# Patient Record
Sex: MALE | Race: WHITE | NOT HISPANIC OR LATINO | URBAN - METROPOLITAN AREA
[De-identification: names, ages, dates, MRNs, and addresses within clinical notes are randomized per-mention and may not be internally consistent; named-entity substitution may affect disease eponyms.]

---

## 2019-09-03 ENCOUNTER — OFFICE VISIT - RIVER FALLS (OUTPATIENT)
Dept: FAMILY MEDICINE | Facility: CLINIC | Age: 64
End: 2019-09-03

## 2019-09-03 ASSESSMENT — MIFFLIN-ST. JEOR: SCORE: 2158.61

## 2019-10-07 ENCOUNTER — OFFICE VISIT - RIVER FALLS (OUTPATIENT)
Dept: FAMILY MEDICINE | Facility: CLINIC | Age: 64
End: 2019-10-07

## 2019-10-07 ASSESSMENT — MIFFLIN-ST. JEOR: SCORE: 2137.74

## 2022-02-12 VITALS
HEART RATE: 72 BPM | HEIGHT: 71 IN | BODY MASS INDEX: 42.11 KG/M2 | HEIGHT: 71 IN | DIASTOLIC BLOOD PRESSURE: 72 MMHG | DIASTOLIC BLOOD PRESSURE: 74 MMHG | WEIGHT: 296.2 LBS | HEART RATE: 72 BPM | WEIGHT: 300.8 LBS | BODY MASS INDEX: 41.47 KG/M2 | SYSTOLIC BLOOD PRESSURE: 120 MMHG | SYSTOLIC BLOOD PRESSURE: 122 MMHG

## 2022-02-15 NOTE — TELEPHONE ENCOUNTER
---------------------  From: Anna Alves CMA (Phone Messages Pool ([id number]_[facility]))   To: Sleep Message Pool ([id number]_[facility]); ZIM Message Pool (32224_[place] [facility] [place]);     Sent: 9/10/2019 11:00:45 AM CDT  Subject: cpap mask     Phone Message    PCP:   FERMIN      Time of Call:  1057       Person Calling:  pt  Phone number:  [phone]    Returned call at: _    Note:   Pt saw FERMIN on 9/3 for new cpap order. He uses a nasal mask and that was not on order. [facility] should be sending us something for this order (it looks like order scanned into chart from 9/4 but order not signed by FERMIN). Please resend with nasal mask.    Last office visit and reason:  _Pt called and informed that this was faxed.

## 2022-02-15 NOTE — PROGRESS NOTES
Chief Complaint    f/u cpap machine-  having problems with new machine  History of Present Illness      64-year-old here to discuss his sleep apnea      He has a new machine set at 13 cm says though it seems to be struggling still is very tired now during the day and it seems comfortable to wear through the night and is been taking it off during sleep download does show an AHI of 50 at this pressure.  Only a little bit of leaking.  Review of Systems      Positive for daytime sleepiness negative for cough negative for shortness of breath negative for congestion  Physical Exam   Vitals & Measurements    HR: 72(Peripheral)  BP: 120/72     HT: 70.5 in  WT: 296.2 lb  BMI: 41.89       Alert and oriented BMI of 42       Nonlabored respirations       Normal oropharynx except for malipotti III  Assessment/Plan       1. Obstructive sleep apnea syndrome (G47.33)         Changes machine to auto titrating 8-20 talked about what he should expect to let us know if he is having problems and will recheck his download in 1 week  Patient Information     Name:EMILIA GRECO      Address:      [address] [place] [id number]     Sex:Male     YOB: 1955     [phone]     Emergency Contact:[facility] EMERGENCY, CONTACT     MRN:[id number]     FIN:[id number]     Location:[facility]     Date of Service:10/07/2019      Primary Care Physician:       Jeff Wilder MD, [phone]      Attending Physician:       Jeff Wilder MD, [phone]  Problem List/Past Medical History    Ongoing     Bipolar disorder, unspecified     Degeneration of lumbar or lumbosacral intervertebral disc     Depressive disorder, not elsewhere classified     Esophageal reflux     Hypertension     Nonspecific abnormal results of function study of kidney     Obesity, unspecified     Obstructive sleep apnea syndrome     Other and unspecified hyperlipidemia     Unspecified sleep apnea    Historical     left  hand numbness     left medial elbow pain  Medications    amLODIPine, Oral, daily    CLONAZEPAM 0.5 MG ORAL TABLET ([id number]), 0.5 mg, Oral, tid    DOXAZOSIN 2 MG ORAL TABLET ([id number]), 2 mg, Oral    METOPROLOL TARTRATE 50 MG ORAL TABLET ([id number]), 50 mg, Oral, bid    Nexium, Oral, daily    PRAVASTATIN 20 MG ORAL TABLET ([id number]), 20 mg, Oral    Replacement CPAP +13cm H2o for use daily, See Instructions, 11 refills    ZOLOFT 100 MG ORAL TABLET ([id number]), 100 mg, Oral, daily  Allergies    Bee Stings    No known allergies  Social History    Smoking Status - 10/07/2019     Never smoker  Immunizations      Vaccine Date Status      influenza virus vaccine, inactivated 09/26/2019 Recorded      influenza virus vaccine, inactivated 08/13/2018 Recorded      zoster vaccine, inactivated 08/13/2018 Recorded      zoster vaccine, inactivated 05/04/2018 Recorded      influenza virus vaccine, inactivated 09/14/2017 Recorded      pneumococcal (PCV13) 08/17/2017 Recorded      influenza virus vaccine, inactivated 09/21/2016 Recorded      pneumococcal (PPSV23) 04/12/2016 Recorded      influenza virus vaccine, inactivated 09/22/2015 Recorded      tetanus/diphth/pertuss (Tdap) adult/adol 09/12/2015 Recorded      influenza virus vaccine, inactivated 09/29/2014 Recorded      influenza virus vaccine, inactivated 10/09/2013 Recorded      influenza virus vaccine, inactivated 09/18/2012 Recorded      tetanus/diphth/pertuss (Tdap) adult/adol 06/18/2012 Recorded      ZOS, shingles 09/27/2011 Recorded      influenza virus vaccine, inactivated 09/26/2011 Recorded      influenza virus vaccine, inactivated 10/12/2010 Recorded      influenza virus vaccine, inactivated 08/28/2009 Recorded      influenza virus vaccine, inactivated 10/29/2008 Recorded      influenza virus vaccine, inactivated 10/22/2007 Recorded      Td 07/23/2003 Recorded

## 2022-02-15 NOTE — TELEPHONE ENCOUNTER
---------------------  From: Jeff Wilder MD   Sent: 11/8/2019 5:28:35 PM CST  Subject: General Message     download of CPAP done.   AHI is 12 but much better than previous wiht mild leak

## 2022-02-15 NOTE — PROGRESS NOTES
Chief Complaint    Is needing a new cpap machine because his stopped working.  History of Present Illness      He has a history of severe obstructive sleep apnea and AHI is 122.  He has been on CPAP with wonderful response.  Recently his machine stopped working he talk to his supplier says he needs a new prescription and they will supply him with a machine  Review of Systems      No fever, no nasal congestion, no headaches, no shortness of breath  Physical Exam   Vitals & Measurements    HR: 72(Peripheral)  BP: 122/74     HT: 70.5 in  WT: 300.8 lb  BMI: 42.55       Alert and oriented      Neck is thick tongue is prominent and obscures the uvula       Lungs are clear       2+ peripheral edema lower extremities  Assessment/Plan       1. Obstructive sleep apnea syndrome (G47.33)         Patient has had a good response to CPAP at 13 cm of pressure will help him get a new machine so he can continue his therapy       Orders:         Miscellaneous Rx Supply, Replacement CPAP +13cm H2o for use daily, See Instructions, Instructions: Heated humidifier x1; Humidifier chamber x1; Heated tubing x1; Full face mask of choice with headgear x1; Cushion x 1; Filters: Disposable x1pk & Reusable x1pk. Length of Ne..., (Ordered)  Patient Information     Name:EMILIA GRECO      Address:      [address] [place] [id number]     Sex:Male     YOB: 1955     MRN:[id number]     FIN:[id number]     Location:[facility]     Date of Service:09/03/2019      Primary Care Physician:       Jeff Wilder MD, [phone]      Attending Physician:       Jeff Wilder MD, [phone]  Problem List/Past Medical History    Ongoing     Bipolar disorder, unspecified     Degeneration of lumbar or lumbosacral intervertebral disc     Depressive disorder, not elsewhere classified     Esophageal reflux     Hypertension     Nonspecific abnormal results of function study of kidney     Obesity, unspecified      Obstructive sleep apnea syndrome     Other and unspecified hyperlipidemia     Unspecified sleep apnea    Historical     left hand numbness     left medial elbow pain  Medications    amLODIPine, Oral, daily    CLONAZEPAM 0.5 MG ORAL TABLET ([id number]), 0.5 mg, Oral, tid    DOXAZOSIN 2 MG ORAL TABLET ([id number]), 2 mg, Oral    METOPROLOL TARTRATE 50 MG ORAL TABLET ([id number]), 50 mg, Oral, bid    Nexium, Oral, daily    PRAVASTATIN 20 MG ORAL TABLET ([id number]), 20 mg, Oral    Replacement CPAP +13cm H2o for use daily, See Instructions, 11 refills    ZOLOFT 100 MG ORAL TABLET ([id number]), 100 mg, Oral, daily  Allergies    Bee Stings    No known allergies  Social History    Smoking Status - 09/03/2019     Never smoker  Immunizations      Vaccine Date Status      influenza virus vaccine, inactivated 08/13/2018 Recorded      zoster vaccine, inactivated 08/13/2018 Recorded      zoster vaccine, inactivated 05/04/2018 Recorded      influenza virus vaccine, inactivated 09/14/2017 Recorded      pneumococcal (PCV13) 08/17/2017 Recorded      influenza virus vaccine, inactivated 09/21/2016 Recorded      pneumococcal (PPSV23) 04/12/2016 Recorded      influenza virus vaccine, inactivated 09/22/2015 Recorded      tetanus/diphth/pertuss (Tdap) adult/adol 09/12/2015 Recorded      influenza virus vaccine, inactivated 09/29/2014 Recorded      influenza virus vaccine, inactivated 10/09/2013 Recorded      influenza virus vaccine, inactivated 09/18/2012 Recorded      tetanus/diphth/pertuss (Tdap) adult/adol 06/18/2012 Recorded      ZOS, shingles 09/27/2011 Recorded      influenza virus vaccine, inactivated 09/26/2011 Recorded      influenza virus vaccine, inactivated 10/12/2010 Recorded      influenza virus vaccine, inactivated 08/28/2009 Recorded      influenza virus vaccine, inactivated 10/29/2008 Recorded      influenza virus vaccine, inactivated 10/22/2007 Recorded      Td 07/23/2003 Recorded

## 2022-02-15 NOTE — NURSING NOTE
Comprehensive Intake Entered On:  9/3/2019 3:15 PM CDT    Performed On:  9/3/2019 3:11 PM CDT by Ana VILLAFUERTE Mayte               Summary   Chief Complaint :   Is needing a new cpap machine because his stopped working.   Weight Measured :   300.8 lb(Converted to: 300 lb 13 oz, 136.44 kg)    Height Measured :   70.5 in(Converted to: 5 ft 10 in, 179.07 cm)    Body Mass Index :   42.55 kg/m2 (HI)    Body Surface Area :   2.6 m2   Systolic Blood Pressure :   122 mmHg   Diastolic Blood Pressure :   74 mmHg   Mean Arterial Pressure :   90 mmHg   Peripheral Pulse Rate :   72 bpm   Ana VILLAFUERTE Mayte - 9/3/2019 3:11 PM CDT   Health Status   Allergies Verified? :   Yes   Medication History Verified? :   Yes   Pre-Visit Planning Status :   Completed   Tobacco Use? :   Never smoker   Mayte Perez CMA - 9/3/2019 3:11 PM CDT   Consents   Consent for Immunization Exchange :   Consent Granted   Consent for Immunizations to Providers :   Consent Granted   Mayte Perez CMA - 9/3/2019 3:11 PM CDT   Meds / Allergies   (As Of: 9/3/2019 3:15:59 PM CDT)   Allergies (Active)   Bee Stings  Estimated Onset Date:   Unspecified ; Created By:   Generated Domain User for [id number]; Reaction Status:   Active ; Substance:   Bee Stings ; Updated By:   Generated Domain User for [id number]; Reviewed Date:   4/29/2014 11:32 AM CDT      No known allergies  Estimated Onset Date:   Unspecified ; Created By:   Generated Domain User for [id number]; Reaction Status:   Active ; Substance:   No known allergies ; Updated By:   Generated Domain User for [id number]; Reviewed Date:   4/29/2014 11:32 AM CDT        Medication List   (As Of: 9/3/2019 3:15:59 PM CDT)   Home Meds    amLODIPine  :   amLODIPine ; Status:   Documented ; Ordered As Mnemonic:   amLODIPine ; Simple Display Line:   Oral, daily, 0 Refill(s) ; Catalog Code:   amLODIPine ; Order Dt/Tm:   9/3/2019 3:14:43 PM          clonazepam  :   clonazepam ; Status:   Documented ; Ordered As  Mnemonic:   CLONAZEPAM 0.5 MG ORAL TABLET ([id number]) ; Simple Display Line:   0.5 mg, po, tid ; Catalog Code:   clonazePAM ; Order Dt/Tm:   9/7/2010 12:19:38 PM          doxazosin  :   doxazosin ; Status:   Documented ; Ordered As Mnemonic:   DOXAZOSIN 2 MG ORAL TABLET (m51010) ; Simple Display Line:   2 mg, po, 90 tab(s) ; Catalog Code:   doxazosin ; Order Dt/Tm:   7/23/2013 6:26:46 PM ; Comment:   Freq: Daily;          esomeprazole  :   esomeprazole ; Status:   Documented ; Ordered As Mnemonic:   Nexium ; Simple Display Line:   po, daily ; Catalog Code:   esomeprazole ; Order Dt/Tm:   4/29/2014 10:05:11 AM          metoprolol  :   metoprolol ; Status:   Documented ; Ordered As Mnemonic:   METOPROLOL TARTRATE 50 MG ORAL TABLET ([id number]) ; Simple Display Line:   50 mg, po, bid, 180 tab(s) ; Catalog Code:   metoprolol ; Order Dt/Tm:   3/11/2013 2:29:13 PM          pravastatin  :   pravastatin ; Status:   Documented ; Ordered As Mnemonic:   PRAVASTATIN 20 MG ORAL TABLET ([id number]) ; Simple Display Line:   20 mg, po, Will currently try 1/2 of 40mg daily., 90 tab(s) ; Catalog Code:   pravastatin ; Order Dt/Tm:   7/23/2013 6:27:08 PM ; Comment:   Freq: Daily;          sertraline  :   sertraline ; Status:   Documented ; Ordered As Mnemonic:   ZOLOFT 100 MG ORAL TABLET ([id number]) ; Simple Display Line:   100 mg, po, daily ; Catalog Code:   sertraline ; Order Dt/Tm:   4/25/2012 12:55:22 PM

## 2022-02-15 NOTE — NURSING NOTE
Comprehensive Intake Entered On:  10/7/2019 2:30 PM CDT    Performed On:  10/7/2019 2:27 PM CDT by Mayte Perez CMA               Summary   Chief Complaint :   f/u cpap machine-  having problems with new machine   Weight Measured :   296.2 lb(Converted to: 296 lb 3 oz, 134.35 kg)    Height Measured :   70.5 in(Converted to: 5 ft 10 in, 179.07 cm)    Body Mass Index :   41.89 kg/m2 (HI)    Body Surface Area :   2.58 m2   Systolic Blood Pressure :   120 mmHg   Diastolic Blood Pressure :   72 mmHg   Mean Arterial Pressure :   88 mmHg   Peripheral Pulse Rate :   72 bpm   Mayte Perez CMA - 10/7/2019 2:27 PM CDT   Health Status   Allergies Verified? :   Yes   Medication History Verified? :   Yes   Pre-Visit Planning Status :   Completed   Tobacco Use? :   Never smoker   Mayte Perez CMA - 10/7/2019 2:27 PM CDT   Consents   Consent for Immunization Exchange :   Consent Granted   Consent for Immunizations to Providers :   Consent Granted   Mayte Perez CMA - 10/7/2019 2:27 PM CDT   Meds / Allergies   (As Of: 10/7/2019 2:30:52 PM CDT)   Allergies (Active)   Bee Stings  Estimated Onset Date:   Unspecified ; Created By:   Generated Domain User for [id number]; Reaction Status:   Active ; Substance:   Bee Stings ; Updated By:   [employer] Domain User for [id number]; Reviewed Date:   4/29/2014 11:32 AM CDT      No known allergies  Estimated Onset Date:   Unspecified ; Created By:   [employer] Domain User for [id number]; Reaction Status:   Active ; Substance:   No known allergies ; Updated By:   Generated Domain User for [id number]; Reviewed Date:   4/29/2014 11:32 AM CDT        Medication List   (As Of: 10/7/2019 2:30:52 PM CDT)   Prescription/Discharge Order    Miscellaneous Rx Supply  :   Miscellaneous Rx Supply ; Status:   Prescribed ; Ordered As Mnemonic:   Replacement CPAP +13cm H2o for use daily ; Simple Display Line:   See Instructions, Heated humidifier x1; Humidifier chamber x1;  Heated tubing x1; Full  face mask of choice with headgear  x1; Cushion x 1;  Filters: Disposable x1pk & Reusable x1pk.  Length of Need = 99 Months, 1 EA, 11 Refill(s) ; Ordering Provider:   Jeff Wilder MD; Catalog Code:   Miscellaneous Rx Supply ; Order Dt/Tm:   9/3/2019 3:23:57 PM CDT            Home Meds    amLODIPine  :   amLODIPine ; Status:   Documented ; Ordered As Mnemonic:   amLODIPine ; Simple Display Line:   Oral, daily, 0 Refill(s) ; Catalog Code:   amLODIPine ; Order Dt/Tm:   9/3/2019 3:14:43 PM CDT          clonazepam  :   clonazepam ; Status:   Documented ; Ordered As Mnemonic:   CLONAZEPAM 0.5 MG ORAL TABLET ([id number]) ; Simple Display Line:   0.5 mg, po, tid ; Catalog Code:   clonazePAM ; Order Dt/Tm:   9/7/2010 12:19:38 PM CDT          doxazosin  :   doxazosin ; Status:   Documented ; Ordered As Mnemonic:   DOXAZOSIN 2 MG ORAL TABLET ([id number]) ; Simple Display Line:   2 mg, po, 90 tab(s) ; Catalog Code:   doxazosin ; Order Dt/Tm:   7/23/2013 6:26:46 PM CDT ; Comment:   Freq: Daily;          esomeprazole  :   esomeprazole ; Status:   Documented ; Ordered As Mnemonic:   Nexium ; Simple Display Line:   po, daily ; Catalog Code:   esomeprazole ; Order Dt/Tm:   4/29/2014 10:05:11 AM CDT          metoprolol  :   metoprolol ; Status:   Documented ; Ordered As Mnemonic:   METOPROLOL TARTRATE 50 MG ORAL TABLET ([id number]) ; Simple Display Line:   50 mg, po, bid, 180 tab(s) ; Catalog Code:   metoprolol ; Order Dt/Tm:   3/11/2013 2:29:13 PM CDT          pravastatin  :   pravastatin ; Status:   Documented ; Ordered As Mnemonic:   PRAVASTATIN 20 MG ORAL TABLET ([id number]) ; Simple Display Line:   20 mg, po, Will currently try 1/2 of 40mg daily., 90 tab(s) ; Catalog Code:   pravastatin ; Order Dt/Tm:   7/23/2013 6:27:08 PM CDT ; Comment:   Freq: Daily;          sertraline  :   sertraline ; Status:   Documented ; Ordered As Mnemonic:   ZOLOFT 100 MG ORAL TABLET ([id number]) ; Simple Display Line:   100 mg, po, daily ; Catalog Code:   sertraline  ; Order Dt/Tm:   4/25/2012 12:55:22 PM CDT